# Patient Record
Sex: MALE | Race: WHITE
[De-identification: names, ages, dates, MRNs, and addresses within clinical notes are randomized per-mention and may not be internally consistent; named-entity substitution may affect disease eponyms.]

---

## 2023-02-26 ENCOUNTER — HOSPITAL ENCOUNTER (EMERGENCY)
Dept: HOSPITAL 54 - ER | Age: 33
Discharge: HOME | End: 2023-02-26
Payer: MEDICAID

## 2023-02-26 VITALS — BODY MASS INDEX: 25.2 KG/M2 | WEIGHT: 180 LBS | HEIGHT: 71 IN

## 2023-02-26 VITALS — SYSTOLIC BLOOD PRESSURE: 138 MMHG | DIASTOLIC BLOOD PRESSURE: 84 MMHG

## 2023-02-26 DIAGNOSIS — R42: Primary | ICD-10-CM

## 2023-02-26 DIAGNOSIS — Z79.899: ICD-10-CM

## 2023-02-26 LAB
ALBUMIN SERPL BCP-MCNC: 3.9 G/DL (ref 3.4–5)
ALP SERPL-CCNC: 62 U/L (ref 46–116)
ALT SERPL W P-5'-P-CCNC: 24 U/L (ref 12–78)
AST SERPL W P-5'-P-CCNC: 22 U/L (ref 15–37)
BASOPHILS # BLD AUTO: 0.1 K/UL (ref 0–0.2)
BASOPHILS NFR BLD AUTO: 0.5 % (ref 0–2)
BILIRUB SERPL-MCNC: 0.4 MG/DL (ref 0.2–1)
BUN SERPL-MCNC: 14 MG/DL (ref 7–18)
CALCIUM SERPL-MCNC: 9.2 MG/DL (ref 8.5–10.1)
CHLORIDE SERPL-SCNC: 104 MMOL/L (ref 98–107)
CO2 SERPL-SCNC: 28 MMOL/L (ref 21–32)
CREAT SERPL-MCNC: 0.9 MG/DL (ref 0.6–1.3)
EOSINOPHIL NFR BLD AUTO: 1.7 % (ref 0–6)
GLUCOSE SERPL-MCNC: 112 MG/DL (ref 74–106)
HCT VFR BLD AUTO: 48 % (ref 39–51)
HGB BLD-MCNC: 16.6 G/DL (ref 13.5–17.5)
LYMPHOCYTES NFR BLD AUTO: 2.7 K/UL (ref 0.8–4.8)
LYMPHOCYTES NFR BLD AUTO: 26.3 % (ref 20–44)
MAGNESIUM SERPL-MCNC: 2 MG/DL (ref 1.8–2.4)
MCHC RBC AUTO-ENTMCNC: 35 G/DL (ref 31–36)
MCV RBC AUTO: 83 FL (ref 80–96)
MONOCYTES NFR BLD AUTO: 0.6 K/UL (ref 0.1–1.3)
MONOCYTES NFR BLD AUTO: 6.3 % (ref 2–12)
NEUTROPHILS # BLD AUTO: 6.7 K/UL (ref 1.8–8.9)
NEUTROPHILS NFR BLD AUTO: 65.2 % (ref 43–81)
PLATELET # BLD AUTO: 190 K/UL (ref 150–450)
POTASSIUM SERPL-SCNC: 3.5 MMOL/L (ref 3.5–5.1)
PROT SERPL-MCNC: 8.1 G/DL (ref 6.4–8.2)
RBC # BLD AUTO: 5.74 MIL/UL (ref 4.5–6)
SODIUM SERPL-SCNC: 140 MMOL/L (ref 136–145)
TSH SERPL DL<=0.005 MIU/L-ACNC: 2.38 UIU/ML (ref 0.36–3.74)
WBC NRBC COR # BLD AUTO: 10.2 K/UL (ref 4.3–11)

## 2023-02-26 PROCEDURE — 93005 ELECTROCARDIOGRAM TRACING: CPT

## 2023-02-26 PROCEDURE — 71045 X-RAY EXAM CHEST 1 VIEW: CPT

## 2023-02-26 PROCEDURE — 99285 EMERGENCY DEPT VISIT HI MDM: CPT

## 2023-02-26 PROCEDURE — 84443 ASSAY THYROID STIM HORMONE: CPT

## 2023-02-26 PROCEDURE — 36415 COLL VENOUS BLD VENIPUNCTURE: CPT

## 2023-02-26 PROCEDURE — 83735 ASSAY OF MAGNESIUM: CPT

## 2023-02-26 PROCEDURE — 85025 COMPLETE CBC W/AUTO DIFF WBC: CPT

## 2023-02-26 PROCEDURE — 80053 COMPREHEN METABOLIC PANEL: CPT

## 2023-02-26 PROCEDURE — 70450 CT HEAD/BRAIN W/O DYE: CPT

## 2023-02-26 PROCEDURE — 83880 ASSAY OF NATRIURETIC PEPTIDE: CPT

## 2023-02-26 PROCEDURE — 84484 ASSAY OF TROPONIN QUANT: CPT

## 2023-02-26 PROCEDURE — 85730 THROMBOPLASTIN TIME PARTIAL: CPT

## 2023-02-26 PROCEDURE — 85652 RBC SED RATE AUTOMATED: CPT

## 2023-02-26 NOTE — NUR
PT AMBULATORY TO ER BED 15 C/O DIZZINESS, LIGHTHEADEDNESS AND RINGING IN THE 
EARS FOR MONTHS NOW. NO FACIAL DROOP, FOCAL WEAKNESS NOTED PTA. PT STATES 
SYMPTOMS WORST TODAY. STABLE VITALS. NAD NOTED. AWAITING MD SCHAFER.